# Patient Record
Sex: MALE | Race: WHITE | URBAN - METROPOLITAN AREA
[De-identification: names, ages, dates, MRNs, and addresses within clinical notes are randomized per-mention and may not be internally consistent; named-entity substitution may affect disease eponyms.]

---

## 2023-04-20 LAB
ALANINE AMINOTRANSFERASE (SGPT) (U/L) IN SER/PLAS: 27 U/L (ref 10–52)
ALBUMIN (G/DL) IN SER/PLAS: 4.1 G/DL (ref 3.4–5)
ALBUMIN (MG/L) IN URINE: 17.6 MG/L
ALBUMIN/CREATININE (UG/MG) IN URINE: 17.7 UG/MG CRT (ref 0–30)
ALKALINE PHOSPHATASE (U/L) IN SER/PLAS: 79 U/L (ref 33–136)
ANION GAP IN SER/PLAS: 10 MMOL/L (ref 10–20)
ASPARTATE AMINOTRANSFERASE (SGOT) (U/L) IN SER/PLAS: 20 U/L (ref 9–39)
BILIRUBIN TOTAL (MG/DL) IN SER/PLAS: 0.9 MG/DL (ref 0–1.2)
CALCIUM (MG/DL) IN SER/PLAS: 9.8 MG/DL (ref 8.6–10.3)
CARBON DIOXIDE, TOTAL (MMOL/L) IN SER/PLAS: 28 MMOL/L (ref 21–32)
CHLORIDE (MMOL/L) IN SER/PLAS: 107 MMOL/L (ref 98–107)
CHOLESTEROL (MG/DL) IN SER/PLAS: 80 MG/DL (ref 0–199)
CHOLESTEROL IN HDL (MG/DL) IN SER/PLAS: 40.8 MG/DL
CHOLESTEROL/HDL RATIO: 2
CREATININE (MG/DL) IN SER/PLAS: 1.3 MG/DL (ref 0.5–1.3)
CREATININE (MG/DL) IN URINE: 99.2 MG/DL (ref 20–370)
ERYTHROCYTE DISTRIBUTION WIDTH (RATIO) BY AUTOMATED COUNT: 13.3 % (ref 11.5–14.5)
ERYTHROCYTE MEAN CORPUSCULAR HEMOGLOBIN CONCENTRATION (G/DL) BY AUTOMATED: 32.5 G/DL (ref 32–36)
ERYTHROCYTE MEAN CORPUSCULAR VOLUME (FL) BY AUTOMATED COUNT: 97 FL (ref 80–100)
ERYTHROCYTES (10*6/UL) IN BLOOD BY AUTOMATED COUNT: 4.65 X10E12/L (ref 4.5–5.9)
ESTIMATED AVERAGE GLUCOSE FOR HBA1C: 131 MG/DL
GFR MALE: 54 ML/MIN/1.73M2
GLUCOSE (MG/DL) IN SER/PLAS: 114 MG/DL (ref 74–99)
HEMATOCRIT (%) IN BLOOD BY AUTOMATED COUNT: 45.2 % (ref 41–52)
HEMOGLOBIN (G/DL) IN BLOOD: 14.7 G/DL (ref 13.5–17.5)
HEMOGLOBIN A1C/HEMOGLOBIN TOTAL IN BLOOD: 6.2 %
LDL: 23 MG/DL (ref 0–99)
LEUKOCYTES (10*3/UL) IN BLOOD BY AUTOMATED COUNT: 7.8 X10E9/L (ref 4.4–11.3)
NRBC (PER 100 WBCS) BY AUTOMATED COUNT: 0 /100 WBC (ref 0–0)
PLATELETS (10*3/UL) IN BLOOD AUTOMATED COUNT: 208 X10E9/L (ref 150–450)
POTASSIUM (MMOL/L) IN SER/PLAS: 4.8 MMOL/L (ref 3.5–5.3)
PROTEIN TOTAL: 7.1 G/DL (ref 6.4–8.2)
SODIUM (MMOL/L) IN SER/PLAS: 140 MMOL/L (ref 136–145)
THYROTROPIN (MIU/L) IN SER/PLAS BY DETECTION LIMIT <= 0.05 MIU/L: 2.43 MIU/L (ref 0.44–3.98)
TRIGLYCERIDE (MG/DL) IN SER/PLAS: 82 MG/DL (ref 0–149)
URATE (MG/DL) IN SER/PLAS: 5.4 MG/DL (ref 4–7.5)
UREA NITROGEN (MG/DL) IN SER/PLAS: 34 MG/DL (ref 6–23)
VLDL: 16 MG/DL (ref 0–40)

## 2023-04-21 LAB
IMMUNOGLOBULIN LIGHT CHAINS KAPPA/LAMBDA (MASS RATIO) IN SERUM: 7.01 (ref 0.26–1.65)
IMMUNOGLOBULIN LIGHT CHAINS.KAPPA (MG/DL) IN SERUM: 13.24 MG/DL (ref 0.33–1.94)
IMMUNOGLOBULIN LIGHT CHAINS.LAMBDA (MG/DL) IN SERUM: 1.89 MG/DL (ref 0.57–2.63)

## 2023-04-24 LAB
PROSTATE SPECIFIC AG (NG/ML) IN SER/PLAS: 4.2 NG/ML (ref 0–4)
PROSTATE SPECIFIC AG FREE (NG/ML) IN SER/PLAS: 1.3 NG/ML
PROSTATE SPECIFIC AG FREE/PROSTATE SPECIFIC AG TOTAL IN SER/PLAS: 31 %

## 2023-09-23 PROBLEM — E78.5 HYPERLIPIDEMIA: Status: ACTIVE | Noted: 2023-09-23

## 2023-09-23 PROBLEM — I25.10 CAD (CORONARY ARTERY DISEASE): Status: ACTIVE | Noted: 2023-09-23

## 2023-09-23 PROBLEM — E66.9 OBESITY: Status: ACTIVE | Noted: 2023-09-23

## 2023-09-23 PROBLEM — E11.21 DIABETIC NEPHROPATHY (MULTI): Status: ACTIVE | Noted: 2023-09-23

## 2023-09-23 PROBLEM — G89.29 CHRONIC PAIN OF TOES OF BOTH FEET: Status: ACTIVE | Noted: 2023-09-23

## 2023-09-23 PROBLEM — R03.0 ELEVATED BLOOD PRESSURE READING WITHOUT DIAGNOSIS OF HYPERTENSION: Status: ACTIVE | Noted: 2023-09-23

## 2023-09-23 PROBLEM — N40.0 BPH (BENIGN PROSTATIC HYPERPLASIA): Status: ACTIVE | Noted: 2023-09-23

## 2023-09-23 PROBLEM — E55.9 VITAMIN D DEFICIENCY: Status: ACTIVE | Noted: 2023-09-23

## 2023-09-23 PROBLEM — E88.810 METABOLIC SYNDROME X: Status: ACTIVE | Noted: 2023-09-23

## 2023-09-23 PROBLEM — I87.323 CHRONIC VENOUS HYPERTENSION WITH INFLAMMATION INVOLVING BOTH SIDES: Status: ACTIVE | Noted: 2023-09-23

## 2023-09-23 PROBLEM — R73.03 BORDERLINE TYPE 2 DIABETES MELLITUS: Status: ACTIVE | Noted: 2023-09-23

## 2023-09-23 PROBLEM — H60.549 OTITIS EXTERNA, ECZEMATOID: Status: ACTIVE | Noted: 2023-09-23

## 2023-09-23 PROBLEM — N18.30 CHRONIC KIDNEY DISEASE, STAGE 3 (MULTI): Status: ACTIVE | Noted: 2023-09-23

## 2023-09-23 PROBLEM — R35.1 NOCTURIA: Status: ACTIVE | Noted: 2023-09-23

## 2023-09-23 PROBLEM — I10 ESSENTIAL HYPERTENSION: Status: ACTIVE | Noted: 2023-09-23

## 2023-09-23 PROBLEM — N28.1 RENAL CYST: Status: ACTIVE | Noted: 2023-09-23

## 2023-09-23 PROBLEM — M1A.9XX0 CHRONIC GOUT: Status: ACTIVE | Noted: 2023-09-23

## 2023-09-23 PROBLEM — Z79.01 CHRONIC ANTICOAGULATION: Status: ACTIVE | Noted: 2023-09-23

## 2023-09-23 PROBLEM — R73.01 ELEVATED FASTING GLUCOSE: Status: ACTIVE | Noted: 2023-09-23

## 2023-09-23 PROBLEM — I48.91 ATRIAL FIBRILLATION (MULTI): Status: ACTIVE | Noted: 2023-09-23

## 2023-09-23 PROBLEM — M79.674 CHRONIC PAIN OF TOES OF BOTH FEET: Status: ACTIVE | Noted: 2023-09-23

## 2023-09-23 PROBLEM — M79.675 CHRONIC PAIN OF TOES OF BOTH FEET: Status: ACTIVE | Noted: 2023-09-23

## 2023-09-23 PROBLEM — G56.00 CARPAL TUNNEL SYNDROME: Status: ACTIVE | Noted: 2023-09-23

## 2023-09-23 PROBLEM — K57.32 DIVERTICULITIS OF COLON: Status: ACTIVE | Noted: 2023-09-23

## 2023-09-23 RX ORDER — ATORVASTATIN CALCIUM 20 MG/1
20 TABLET, FILM COATED ORAL
COMMUNITY
Start: 2023-03-05

## 2023-09-23 RX ORDER — METOPROLOL SUCCINATE 25 MG/1
25 TABLET, EXTENDED RELEASE ORAL
COMMUNITY
Start: 2023-02-26

## 2023-09-23 RX ORDER — NITROGLYCERIN 0.4 MG/1
0.4 TABLET SUBLINGUAL
COMMUNITY
Start: 2023-02-22

## 2023-09-23 RX ORDER — FEBUXOSTAT 40 MG/1
1 TABLET, FILM COATED ORAL DAILY
COMMUNITY
Start: 2021-12-15

## 2023-09-23 RX ORDER — METOPROLOL SUCCINATE 50 MG/1
50 TABLET, EXTENDED RELEASE ORAL
COMMUNITY
Start: 2023-04-19

## 2023-09-23 RX ORDER — DOXAZOSIN 2 MG/1
1 TABLET ORAL 2 TIMES DAILY
COMMUNITY

## 2023-09-23 RX ORDER — LOSARTAN POTASSIUM 100 MG/1
100 TABLET ORAL
COMMUNITY
Start: 2023-04-19

## 2023-10-23 DIAGNOSIS — D47.2 MGUS (MONOCLONAL GAMMOPATHY OF UNKNOWN SIGNIFICANCE): Primary | ICD-10-CM

## 2023-10-24 ENCOUNTER — LAB (OUTPATIENT)
Dept: LAB | Facility: LAB | Age: 83
End: 2023-10-24
Payer: MEDICARE

## 2023-10-24 DIAGNOSIS — D47.2 MGUS (MONOCLONAL GAMMOPATHY OF UNKNOWN SIGNIFICANCE): ICD-10-CM

## 2023-10-24 LAB
ALBUMIN SERPL BCP-MCNC: 3.8 G/DL (ref 3.4–5)
ALP SERPL-CCNC: 82 U/L (ref 33–136)
ALT SERPL W P-5'-P-CCNC: 35 U/L (ref 10–52)
ANION GAP SERPL CALC-SCNC: 13 MMOL/L (ref 10–20)
AST SERPL W P-5'-P-CCNC: 22 U/L (ref 9–39)
BASOPHILS # BLD AUTO: 0.01 X10*3/UL (ref 0–0.1)
BASOPHILS NFR BLD AUTO: 0.1 %
BILIRUB SERPL-MCNC: 0.6 MG/DL (ref 0–1.2)
BUN SERPL-MCNC: 43 MG/DL (ref 6–23)
CALCIUM SERPL-MCNC: 9.5 MG/DL (ref 8.6–10.3)
CHLORIDE SERPL-SCNC: 106 MMOL/L (ref 98–107)
CO2 SERPL-SCNC: 26 MMOL/L (ref 21–32)
CREAT SERPL-MCNC: 1.45 MG/DL (ref 0.5–1.3)
EOSINOPHIL # BLD AUTO: 0.16 X10*3/UL (ref 0–0.4)
EOSINOPHIL NFR BLD AUTO: 2 %
ERYTHROCYTE [DISTWIDTH] IN BLOOD BY AUTOMATED COUNT: 13.2 % (ref 11.5–14.5)
GFR SERPL CREATININE-BSD FRML MDRD: 47 ML/MIN/1.73M*2
GLUCOSE SERPL-MCNC: 205 MG/DL (ref 74–99)
HCT VFR BLD AUTO: 41.7 % (ref 41–52)
HGB BLD-MCNC: 13.3 G/DL (ref 13.5–17.5)
IGA SERPL-MCNC: 175 MG/DL (ref 70–400)
IGG SERPL-MCNC: 1100 MG/DL (ref 700–1600)
IGM SERPL-MCNC: 412 MG/DL (ref 40–230)
IMM GRANULOCYTES # BLD AUTO: 0.04 X10*3/UL (ref 0–0.5)
IMM GRANULOCYTES NFR BLD AUTO: 0.5 % (ref 0–0.9)
LYMPHOCYTES # BLD AUTO: 1.42 X10*3/UL (ref 0.8–3)
LYMPHOCYTES NFR BLD AUTO: 17.9 %
MCH RBC QN AUTO: 31.7 PG (ref 26–34)
MCHC RBC AUTO-ENTMCNC: 31.9 G/DL (ref 32–36)
MCV RBC AUTO: 100 FL (ref 80–100)
MONOCYTES # BLD AUTO: 0.47 X10*3/UL (ref 0.05–0.8)
MONOCYTES NFR BLD AUTO: 5.9 %
NEUTROPHILS # BLD AUTO: 5.82 X10*3/UL (ref 1.6–5.5)
NEUTROPHILS NFR BLD AUTO: 73.6 %
NRBC BLD-RTO: 0 /100 WBCS (ref 0–0)
PLATELET # BLD AUTO: 220 X10*3/UL (ref 150–450)
PMV BLD AUTO: 9.4 FL (ref 7.5–11.5)
POTASSIUM SERPL-SCNC: 4.6 MMOL/L (ref 3.5–5.3)
PROT SERPL-MCNC: 6.5 G/DL (ref 6.4–8.2)
PROT SERPL-MCNC: 6.7 G/DL (ref 6.4–8.2)
RBC # BLD AUTO: 4.19 X10*6/UL (ref 4.5–5.9)
SODIUM SERPL-SCNC: 140 MMOL/L (ref 136–145)
WBC # BLD AUTO: 7.9 X10*3/UL (ref 4.4–11.3)

## 2023-10-24 PROCEDURE — 36415 COLL VENOUS BLD VENIPUNCTURE: CPT

## 2023-10-24 PROCEDURE — 80053 COMPREHEN METABOLIC PANEL: CPT

## 2023-10-24 PROCEDURE — 84165 PROTEIN E-PHORESIS SERUM: CPT

## 2023-10-24 PROCEDURE — 84155 ASSAY OF PROTEIN SERUM: CPT

## 2023-10-24 PROCEDURE — 82784 ASSAY IGA/IGD/IGG/IGM EACH: CPT

## 2023-10-24 PROCEDURE — 85025 COMPLETE CBC W/AUTO DIFF WBC: CPT

## 2023-10-24 PROCEDURE — 83521 IG LIGHT CHAINS FREE EACH: CPT

## 2023-10-25 LAB
ALBUMIN: 3.7 G/DL (ref 3.4–5)
ALPHA 1 GLOBULIN: 0.3 G/DL (ref 0.2–0.6)
ALPHA 2 GLOBULIN: 0.8 G/DL (ref 0.4–1.1)
BETA GLOBULIN: 0.7 G/DL (ref 0.5–1.2)
GAMMA GLOBULIN: 1.2 G/DL (ref 0.5–1.4)
KAPPA LC SERPL-MCNC: 16.14 MG/DL (ref 0.33–1.94)
KAPPA LC/LAMBDA SER: 8.45 {RATIO} (ref 0.26–1.65)
LAMBDA LC SERPL-MCNC: 1.91 MG/DL (ref 0.57–2.63)
M-PROTEIN 1: 0.4 G/DL
PATH REVIEW-SERUM PROTEIN ELECTROPHORESIS: ABNORMAL
PROTEIN ELECTROPHORESIS COMMENT: ABNORMAL

## 2023-10-30 ENCOUNTER — OFFICE VISIT (OUTPATIENT)
Dept: HEMATOLOGY/ONCOLOGY | Facility: CLINIC | Age: 83
End: 2023-10-30
Payer: MEDICARE

## 2023-10-30 VITALS
HEART RATE: 58 BPM | TEMPERATURE: 96.6 F | OXYGEN SATURATION: 96 % | RESPIRATION RATE: 18 BRPM | WEIGHT: 216.71 LBS | BODY MASS INDEX: 31.81 KG/M2 | DIASTOLIC BLOOD PRESSURE: 63 MMHG | SYSTOLIC BLOOD PRESSURE: 132 MMHG

## 2023-10-30 DIAGNOSIS — D47.2 MGUS (MONOCLONAL GAMMOPATHY OF UNKNOWN SIGNIFICANCE): Primary | ICD-10-CM

## 2023-10-30 DIAGNOSIS — I48.11 LONGSTANDING PERSISTENT ATRIAL FIBRILLATION (MULTI): ICD-10-CM

## 2023-10-30 DIAGNOSIS — M1A.9XX0 CHRONIC GOUT WITHOUT TOPHUS, UNSPECIFIED CAUSE, UNSPECIFIED SITE: ICD-10-CM

## 2023-10-30 DIAGNOSIS — E78.2 MIXED HYPERLIPIDEMIA: ICD-10-CM

## 2023-10-30 DIAGNOSIS — I10 ESSENTIAL HYPERTENSION: ICD-10-CM

## 2023-10-30 PROCEDURE — 3075F SYST BP GE 130 - 139MM HG: CPT | Performed by: INTERNAL MEDICINE

## 2023-10-30 PROCEDURE — 3078F DIAST BP <80 MM HG: CPT | Performed by: INTERNAL MEDICINE

## 2023-10-30 PROCEDURE — 1036F TOBACCO NON-USER: CPT | Performed by: INTERNAL MEDICINE

## 2023-10-30 PROCEDURE — 1159F MED LIST DOCD IN RCRD: CPT | Performed by: INTERNAL MEDICINE

## 2023-10-30 PROCEDURE — 1126F AMNT PAIN NOTED NONE PRSNT: CPT | Performed by: INTERNAL MEDICINE

## 2023-10-30 PROCEDURE — 99214 OFFICE O/P EST MOD 30 MIN: CPT | Performed by: INTERNAL MEDICINE

## 2023-10-30 ASSESSMENT — ENCOUNTER SYMPTOMS
OCCASIONAL FEELINGS OF UNSTEADINESS: 0
LOSS OF SENSATION IN FEET: 0

## 2023-10-30 ASSESSMENT — PAIN SCALES - GENERAL: PAINLEVEL: 0-NO PAIN

## 2023-10-30 NOTE — PROGRESS NOTES
Patient ID: Larry Bardales is a 83 y.o. male.  Referring Physician: No referring provider defined for this encounter.  Primary Care Provider: Bernard Sales MD  Visit Type: Follow Up      Subjective    HPI  I am doing okay    Review of Systems   Constitutional: Negative.    HENT:  Negative.     Eyes: Negative.    Respiratory: Negative.     Cardiovascular: Negative.    Gastrointestinal: Negative.    Endocrine: Negative.    Genitourinary: Negative.     Musculoskeletal: Negative.    Skin: Negative.    Neurological: Negative.    Hematological: Negative.    Psychiatric/Behavioral: Negative.          Objective   BSA: 2.19 meters squared  /63 (BP Location: Right arm)   Pulse 58   Temp 35.9 °C (96.6 °F)   Resp 18   Wt 98.3 kg (216 lb 11.4 oz)   SpO2 96%   BMI 31.81 kg/m²      has no past medical history on file.   has a past surgical history that includes Other surgical history (09/30/2021); Other surgical history (09/30/2021); Other surgical history (09/30/2021); Other surgical history (09/30/2021); Other surgical history (09/30/2021); Other surgical history (01/20/2022); and Other surgical history (01/20/2022).  Family History   Problem Relation Name Age of Onset    Diabetes Mother      Hypertension Father      Heart attack Father       Oncology History    No history exists.       Larry Bardales  reports that he quit smoking about 40 years ago. His smoking use included cigarettes. He smoked an average of 1 pack per day. He has never used smokeless tobacco.  He  reports no history of alcohol use.  He  reports no history of drug use.    Physical Exam  Vitals reviewed.   HENT:      Head: Normocephalic.      Mouth/Throat:      Mouth: Mucous membranes are moist.   Eyes:      Extraocular Movements: Extraocular movements intact.      Pupils: Pupils are equal, round, and reactive to light.   Cardiovascular:      Rate and Rhythm: Normal rate and regular rhythm.      Heart sounds: Normal heart sounds.   Pulmonary:       Breath sounds: Normal breath sounds.   Abdominal:      General: Bowel sounds are normal.      Palpations: Abdomen is soft.   Musculoskeletal:         General: Normal range of motion.      Cervical back: Normal range of motion and neck supple.   Skin:     General: Skin is warm and dry.   Neurological:      General: No focal deficit present.      Mental Status: He is alert and oriented to person, place, and time.   Psychiatric:         Mood and Affect: Mood normal.         WBC   Date/Time Value Ref Range Status   10/24/2023 08:37 AM 7.9 4.4 - 11.3 x10*3/uL Final   04/20/2023 08:18 AM 7.8 4.4 - 11.3 x10E9/L Final   10/31/2022 01:46 PM 9.3 4.4 - 11.3 x10E9/L Final   03/21/2022 10:17 AM 8.4 4.4 - 11.3 x10E9/L Final     nRBC   Date Value Ref Range Status   10/24/2023 0.0 0.0 - 0.0 /100 WBCs Final   04/20/2023 0.0 0.0 - 0.0 /100 WBC Final   03/21/2022 0.0 0.0 - 0.0 /100 WBC Final     RBC   Date Value Ref Range Status   10/24/2023 4.19 (L) 4.50 - 5.90 x10*6/uL Final   04/20/2023 4.65 4.50 - 5.90 x10E12/L Final   10/31/2022 4.22 (L) 4.50 - 5.90 x10E12/L Final   03/21/2022 4.75 4.50 - 5.90 x10E12/L Final     Hemoglobin   Date Value Ref Range Status   10/24/2023 13.3 (L) 13.5 - 17.5 g/dL Final   04/20/2023 14.7 13.5 - 17.5 g/dL Final   10/31/2022 13.6 13.5 - 17.5 g/dL Final   03/21/2022 14.5 13.5 - 17.5 g/dL Final     Hematocrit   Date Value Ref Range Status   10/24/2023 41.7 41.0 - 52.0 % Final   04/20/2023 45.2 41.0 - 52.0 % Final   10/31/2022 41.3 41.0 - 52.0 % Final   03/21/2022 46.0 41.0 - 52.0 % Final     MCV   Date/Time Value Ref Range Status   10/24/2023 08:37  80 - 100 fL Final   04/20/2023 08:18 AM 97 80 - 100 fL Final   10/31/2022 01:46 PM 98 80 - 100 fL Final   03/21/2022 10:17 AM 97 80 - 100 fL Final     MCH   Date/Time Value Ref Range Status   10/24/2023 08:37 AM 31.7 26.0 - 34.0 pg Final     MCHC   Date/Time Value Ref Range Status   10/24/2023 08:37 AM 31.9 (L) 32.0 - 36.0 g/dL Final   04/20/2023 08:18 AM  32.5 32.0 - 36.0 g/dL Final   10/31/2022 01:46 PM 32.9 32.0 - 36.0 g/dL Final   03/21/2022 10:17 AM 31.5 (L) 32.0 - 36.0 g/dL Final     RDW   Date/Time Value Ref Range Status   10/24/2023 08:37 AM 13.2 11.5 - 14.5 % Final   04/20/2023 08:18 AM 13.3 11.5 - 14.5 % Final   10/31/2022 01:46 PM 13.1 11.5 - 14.5 % Final   03/21/2022 10:17 AM 13.7 11.5 - 14.5 % Final     Platelets   Date/Time Value Ref Range Status   10/24/2023 08:37  150 - 450 x10*3/uL Final   04/20/2023 08:18  150 - 450 x10E9/L Final   10/31/2022 01:46  150 - 450 x10E9/L Final   03/21/2022 10:17  150 - 450 x10E9/L Final     MPV   Date/Time Value Ref Range Status   10/24/2023 08:37 AM 9.4 7.5 - 11.5 fL Final     Neutrophils %   Date/Time Value Ref Range Status   10/24/2023 08:37 AM 73.6 40.0 - 80.0 % Final   10/31/2022 01:46 PM 76.3 40.0 - 80.0 % Final   04/12/2021 09:04 AM 76.7 40.0 - 80.0 % Final   01/15/2021 07:50 AM 68.0 40.0 - 80.0 % Final     Immature Granulocytes %, Automated   Date/Time Value Ref Range Status   10/24/2023 08:37 AM 0.5 0.0 - 0.9 % Final     Comment:     Immature Granulocyte Count (IG) includes promyelocytes, myelocytes and metamyelocytes but does not include bands. Percent differential counts (%) should be interpreted in the context of the absolute cell counts (cells/UL).   10/31/2022 01:46 PM 0.2 0.0 - 0.9 % Final     Comment:      Immature Granulocyte Count (IG) includes promyelocytes,    myelocytes and metamyelocytes but does not include bands.   Percent differential counts (%) should be interpreted in the   context of the absolute cell counts (cells/L).     04/12/2021 09:04 AM 0.3 0.0 - 0.9 % Final     Comment:      Immature Granulocyte Count (IG) includes promyelocytes,    myelocytes and metamyelocytes but does not include bands.   Percent differential counts (%) should be interpreted in the   context of the absolute cell counts (cells/L).     01/15/2021 07:50 AM 0.3 0.0 - 0.9 % Final     Comment:       Immature Granulocyte Count (IG) includes promyelocytes,    myelocytes and metamyelocytes but does not include bands.   Percent differential counts (%) should be interpreted in the   context of the absolute cell counts (cells/L).       Lymphocytes %   Date/Time Value Ref Range Status   10/24/2023 08:37 AM 17.9 13.0 - 44.0 % Final   10/31/2022 01:46 PM 14.9 13.0 - 44.0 % Final   04/12/2021 09:04 AM 14.3 13.0 - 44.0 % Final   01/15/2021 07:50 AM 21.1 13.0 - 44.0 % Final     Monocytes %   Date/Time Value Ref Range Status   10/24/2023 08:37 AM 5.9 2.0 - 10.0 % Final   10/31/2022 01:46 PM 6.8 2.0 - 10.0 % Final   04/12/2021 09:04 AM 6.2 2.0 - 10.0 % Final   01/15/2021 07:50 AM 7.3 2.0 - 10.0 % Final     Eosinophils %   Date/Time Value Ref Range Status   10/24/2023 08:37 AM 2.0 0.0 - 6.0 % Final   10/31/2022 01:46 PM 1.6 0.0 - 6.0 % Final   04/12/2021 09:04 AM 2.3 0.0 - 6.0 % Final   01/15/2021 07:50 AM 2.9 0.0 - 6.0 % Final     Basophils %   Date/Time Value Ref Range Status   10/24/2023 08:37 AM 0.1 0.0 - 2.0 % Final   10/31/2022 01:46 PM 0.2 0.0 - 2.0 % Final   04/12/2021 09:04 AM 0.2 0.0 - 2.0 % Final   01/15/2021 07:50 AM 0.4 0.0 - 2.0 % Final     Neutrophils Absolute   Date/Time Value Ref Range Status   10/24/2023 08:37 AM 5.82 (H) 1.60 - 5.50 x10*3/uL Final     Comment:     Percent differential counts (%) should be interpreted in the context of the absolute cell counts (cells/uL).   10/31/2022 01:46 PM 7.11 (H) 1.60 - 5.50 x10E9/L Final   04/12/2021 09:04 AM 6.75 (H) 1.60 - 5.50 x10E9/L Final   01/15/2021 07:50 AM 5.01 1.60 - 5.50 x10E9/L Final     Immature Granulocytes Absolute, Automated   Date/Time Value Ref Range Status   10/24/2023 08:37 AM 0.04 0.00 - 0.50 x10*3/uL Final     Lymphocytes Absolute   Date/Time Value Ref Range Status   10/24/2023 08:37 AM 1.42 0.80 - 3.00 x10*3/uL Final   10/31/2022 01:46 PM 1.39 0.80 - 3.00 x10E9/L Final   04/12/2021 09:04 AM 1.26 0.80 - 3.00 x10E9/L Final   01/15/2021 07:50 AM  "1.55 0.80 - 3.00 x10E9/L Final     Monocytes Absolute   Date/Time Value Ref Range Status   10/24/2023 08:37 AM 0.47 0.05 - 0.80 x10*3/uL Final   10/31/2022 01:46 PM 0.63 0.05 - 0.80 x10E9/L Final   04/12/2021 09:04 AM 0.55 0.05 - 0.80 x10E9/L Final   01/15/2021 07:50 AM 0.54 0.05 - 0.80 x10E9/L Final     Eosinophils Absolute   Date/Time Value Ref Range Status   10/24/2023 08:37 AM 0.16 0.00 - 0.40 x10*3/uL Final   10/31/2022 01:46 PM 0.15 0.00 - 0.40 x10E9/L Final   04/12/2021 09:04 AM 0.20 0.00 - 0.40 x10E9/L Final   01/15/2021 07:50 AM 0.21 0.00 - 0.40 x10E9/L Final     Basophils Absolute   Date/Time Value Ref Range Status   10/24/2023 08:37 AM 0.01 0.00 - 0.10 x10*3/uL Final   10/31/2022 01:46 PM 0.02 0.00 - 0.10 x10E9/L Final   04/12/2021 09:04 AM 0.02 0.00 - 0.10 x10E9/L Final   01/15/2021 07:50 AM 0.03 0.00 - 0.10 x10E9/L Final       No components found for: \"PT\"  No results found for: \"APTT\"    Assessment/Plan    1) MGUS  -labs done on 10/24/2023 included CBC, COMP, SPEP/IF, FLC and quantitative immunoglobulins  -results reviewed--wbc 7.9, hgb 13.3, plt 220,000, creatinine 1.45, calcium 9.5, SPEP/IF with 0.4 gm IgM kappa M protein, total IgM 412, serum kappa LC 16.14  -will continue to follow him annually      2) hypertension  -on losartan  -on metoprolol     3) gout  -on febuxostat     4) atrial fibrillation  -on xarelto     5) hyperlipidemia  -on atorvastatin      Problem List Items Addressed This Visit    None  Visit Diagnoses         Codes    MGUS (monoclonal gammopathy of unknown significance)    -  Primary D47.2    Relevant Orders    Clinic Appointment Request Follow Up; ROMAN MINOR; [id number]    Oncology Line Draw Appointment Request    CBC and Auto Differential    Comprehensive metabolic panel    Serum Protein Electrophoresis    Immunoglobulins (IgG, IgA, IgM)    Sholes/Lambda Free Light Chain, Serum                 Roman Minor MD                         "

## 2023-11-04 PROBLEM — D47.2 MGUS (MONOCLONAL GAMMOPATHY OF UNKNOWN SIGNIFICANCE): Status: ACTIVE | Noted: 2023-11-04

## 2023-11-04 ASSESSMENT — ENCOUNTER SYMPTOMS
ENDOCRINE NEGATIVE: 1
PSYCHIATRIC NEGATIVE: 1
GASTROINTESTINAL NEGATIVE: 1
RESPIRATORY NEGATIVE: 1
CARDIOVASCULAR NEGATIVE: 1
HEMATOLOGIC/LYMPHATIC NEGATIVE: 1
EYES NEGATIVE: 1
NEUROLOGICAL NEGATIVE: 1
CONSTITUTIONAL NEGATIVE: 1
MUSCULOSKELETAL NEGATIVE: 1